# Patient Record
(demographics unavailable — no encounter records)

---

## 2025-02-07 NOTE — PHYSICAL EXAM
[de-identified] : Thoracic spine:  No atrophy, erythema, swelling or ecchymosis.  Tenderness over the thoracic paraspinals.

## 2025-02-07 NOTE — PHYSICAL EXAM
[de-identified] : Thoracic spine:  No atrophy, erythema, swelling or ecchymosis.  Tenderness over the thoracic paraspinals.

## 2025-02-07 NOTE — DISCUSSION/SUMMARY
[de-identified] : A discussion regarding available pain management treatment options occurred with the patient. These included interventional, rehabilitative, pharmacological, and alternative modalities. We will proceed with the following:   Imagin. MRI thoracic spine w/o contrast to evaluate for anatomic changes of the thoracic discs, nerves, and surrounding tissue that will help provide information to accurately diagnose the patient's cause of pain and therefore treat said pain generator in the most effective way possible - whether that be specific physical therapy recommendations, medications, and/or interventional therapies.   Medications: - can take Tylenol 1000 mg up to q6h prn.    Complementary treatment options: - lifestyle modifications discussed - avoid bending and bend with knees - avoid heavy lifting   - Follow up in 2-3 weeks to discuss imaging.   I Diana Avelar attest that this documentation has been prepared under the direction and in the presence of provider Dr. Moisés Cowan.  The documentation recorded by the scribe in my presence, accurately reflects the service I personally performed, and the decisions made by me with my edits as appropriate.   Moisés Cowan, DO

## 2025-02-07 NOTE — DISCUSSION/SUMMARY
[de-identified] : A discussion regarding available pain management treatment options occurred with the patient. These included interventional, rehabilitative, pharmacological, and alternative modalities. We will proceed with the following:   Imagin. MRI thoracic spine w/o contrast to evaluate for anatomic changes of the thoracic discs, nerves, and surrounding tissue that will help provide information to accurately diagnose the patient's cause of pain and therefore treat said pain generator in the most effective way possible - whether that be specific physical therapy recommendations, medications, and/or interventional therapies.   Medications: - can take Tylenol 1000 mg up to q6h prn.    Complementary treatment options: - lifestyle modifications discussed - avoid bending and bend with knees - avoid heavy lifting   - Follow up in 2-3 weeks to discuss imaging.   I Diana Avelar attest that this documentation has been prepared under the direction and in the presence of provider Dr. Moisés Cowan.  The documentation recorded by the scribe in my presence, accurately reflects the service I personally performed, and the decisions made by me with my edits as appropriate.   Moisés Cowan, DO

## 2025-02-07 NOTE — HISTORY OF PRESENT ILLNESS
[FreeTextEntry1] : Ms. Panchal is a 67-year-old female presenting to establish care for her back pain. She is accompanied by her . In June of 2024, she fell down a flight of stairs injuring her mid back and buttock. She ended up going to an urgent care facility the day of and underwent an X-ray of the thoracic spine which was negative for any fracture. Today, she is presenting with intermittent pain in the mid thoracic region. She states her pain waxes and weens. She notes pain mainly on the left side of the thoracic region with referred pain into the ribs. She describes the pain as throbbing, aching and stiffness. She states that pain is never severe to the pain that she cannot function. She rates the pain at a 5/10 on the pain scale. She denies any bowel/bladder incontinence, fevers/chills, recent weight loss or any saddle anesthesia. She has not been managing her pain with any medications.

## 2025-03-11 NOTE — DISCUSSION/SUMMARY
[de-identified] : A discussion regarding available pain management treatment options occurred with the patient. These included interventional, rehabilitative, pharmacological, and alternative modalities. We will proceed with the followin-year-old female with T5 compression fracture, she has osteoporosis, she is following up with her primary care in this regard, she is currently not being medically managed for osteoporosis.  She will follow-up with her PCP to start management.  Interventional/ Procedures: 1. None indicated at this time.   Medication/pharmacological: 1. C/W Tylenol OTC as needed, patient was advised not to exceed 3000 mg per 24 hours.  Patient was advised to avoid alcohol while being on Tylenol. **Unable to take anti-inflammatories due to elevated creatinine level**   Rehabilitative/alternative modalities: 1. Initiate physician directed activity and lifestyle modifications. 2. Participation in active HEP was discussed and printed. 3. Patient was advised to stay away from any heavy lifting. If needed, they were advised to squat and not bend forward. 4.  Patient was given a prescription for physical therapy for thoracic pain/compression fraction at T5  Total clinician time spent today on the patient is 30 minutes including preparing to see the patient, obtaining and/or reviewing and confirming history, performing medically necessary and appropriate examination, counseling and educating the patient and/or family, documenting clinical information in the EHR and communicating and/or referring to other healthcare professionals.  F/u in 6-8 weeks   Total clinician time spent today on the patient is 30 minutes including preparing to see the patient, obtaining and/or reviewing and confirming history, performing medically necessary and appropriate examination, counseling and educating the patient and/or family, documenting clinical information in the EHR and communicating and/or referring to other healthcare professionals.   YAAKOV Oliva DO

## 2025-03-11 NOTE — DISCUSSION/SUMMARY
[de-identified] : A discussion regarding available pain management treatment options occurred with the patient. These included interventional, rehabilitative, pharmacological, and alternative modalities. We will proceed with the followin-year-old female with T5 compression fracture, she has osteoporosis, she is following up with her primary care in this regard, she is currently not being medically managed for osteoporosis.  She will follow-up with her PCP to start management.  Interventional/ Procedures: 1. None indicated at this time.   Medication/pharmacological: 1. C/W Tylenol OTC as needed, patient was advised not to exceed 3000 mg per 24 hours.  Patient was advised to avoid alcohol while being on Tylenol. **Unable to take anti-inflammatories due to elevated creatinine level**   Rehabilitative/alternative modalities: 1. Initiate physician directed activity and lifestyle modifications. 2. Participation in active HEP was discussed and printed. 3. Patient was advised to stay away from any heavy lifting. If needed, they were advised to squat and not bend forward. 4.  Patient was given a prescription for physical therapy for thoracic pain/compression fraction at T5  Total clinician time spent today on the patient is 30 minutes including preparing to see the patient, obtaining and/or reviewing and confirming history, performing medically necessary and appropriate examination, counseling and educating the patient and/or family, documenting clinical information in the EHR and communicating and/or referring to other healthcare professionals.  F/u in 6-8 weeks   Total clinician time spent today on the patient is 30 minutes including preparing to see the patient, obtaining and/or reviewing and confirming history, performing medically necessary and appropriate examination, counseling and educating the patient and/or family, documenting clinical information in the EHR and communicating and/or referring to other healthcare professionals.   YAAKOV Oliva DO

## 2025-03-11 NOTE — HISTORY OF PRESENT ILLNESS
[FreeTextEntry1] : Ms. Panchal is a 67-year-old female presenting to establish care for her back pain. She is accompanied by her . In June of 2024, she fell down a flight of stairs injuring her mid back and buttock. She ended up going to an urgent care facility the day of and underwent an X-ray of the thoracic spine which was negative for any fracture. Today, she is presenting with intermittent pain in the mid thoracic region. She states her pain waxes and weens. She notes pain mainly on the left side of the thoracic region with referred pain into the ribs. She describes the pain as throbbing, aching and stiffness. She states that pain is never severe to the pain that she cannot function. She rates the pain at a 9/10 on the pain scale. She denies any bowel/bladder incontinence, fevers/chills, recent weight loss or any saddle anesthesia. She has not been managing her pain with any medications.   TODAY (3/11/25)  Pt is a very pleasant 67-year-old female who is here for follow-up, she is under our care for thoracic back pain.  She underwent thoracic MRI which showed T5 compression fracture and with bone marrow edema. Pain is worse when she leans forward,  She describes the pain as throbbing, aching and stiffness. She states that pain is never severe to the pain that she cannot function. She rates the pain at a 9/10 on the pain scale. she has been managing her pain with Tylenol 600 mg 1-2 times a day which os giving her good relief.

## 2025-03-11 NOTE — PHYSICAL EXAM
[de-identified] : T spine  No rashes, erythema, edema noted TTP b/l thoracic paraspinals and rhomboid muscles Limited ROM with neck/back flexion and extension 2/2 to pain  seated slump test +

## 2025-03-11 NOTE — PHYSICAL EXAM
[de-identified] : T spine  No rashes, erythema, edema noted TTP b/l thoracic paraspinals and rhomboid muscles Limited ROM with neck/back flexion and extension 2/2 to pain  seated slump test +

## 2025-05-08 NOTE — DISCUSSION/SUMMARY
[FreeTextEntry1] : Assessment Summary:  [The patient is a 68-year-old female who is currently  with no children, residing with her spouse of six months. Born and raised in [place], she moved to [place] in second grade. She recently experienced a job loss in November 2024 after working at [employer] for 14 years and has a total of 48 years of work experience in various companies on [address]. She completed some college education. 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   The patient was last seen by a psychiatrist in 1990, seeking help for depression and anxiety, which she described as circumstantial due to going through a divorce, the death of her grandmother, and being laid off. She was prescribed Prozac and took it for less than a year, which helped her start feeling better. She denies any suicidal or self-harming behaviors, substance use, psychiatric hospitalizations, or incarceration.   In March of this year, she contracted COVID-19 for the third time, which significantly impacted her mental health. She reported feelings of inadequacy and a sense of not belonging, exacerbating some unresolved childhood feelings. This condition manifested with fever, sinus infection, pain, frequent panic attacks, and major anxiety. Though these acute symptoms have subsided, she currently struggles with depression, low mood, poor concentration, and feelings of displacement.   The patient lost her mother a year ago, an event that likely compounded her current emotional state. She is actively seeking employment, joining adult friendship groups, attending Sikh, and volunteering in an effort to stay engaged and manage her symptoms. She remains physically active, frequently visiting the gym and going for long walks with her .   Her medical history includes high blood pressure and high cholesterol, for which she is taking Crestor 10 mg, nebivolol 10 mg, and nifedipine 10 mg.   A significant trauma in her past includes being sent to live with her grandmother at the age of 12 to act as her caregiver, a decision made by her mother that led her to feel abandoned. This responsibility meant she missed out on significant family experiences, such as seeing her younger brother grow up. Recently, similar feelings of abandonment and searching for purpose resurfaced during her COVID-19 experiences and subsequent unemployment.   The patient is here today for an evaluation of her symptoms to better understand and address her current mental health needs.  ]       Assessed Needs- Functional Domain [Anxiety ]       Prioritized Assessed Needs [Anxiety ]       Life goals, strengths, barriers, past successes ["to accept my future, to retire and be satisfied and find new purpose in life" ]       Recommendation:   [ ]      Medication Only [ ]     Individual therapy only [x ]     Medication and Individual therapy [ ]     Group therapy  [Low acute suicide risk] : Low acute suicide risk [No] : No [Not clinically indicated] : Safety Plan completed/updated (for individuals at risk): Not clinically indicated

## 2025-05-08 NOTE — REASON FOR VISIT
[Number can be texted] : number can be texted [OK  to leave message] : OK  to leave message [Community Based Organization] : Community Based Organization [FreeTextEntry3] : [email or web address]  [FreeTextEntry5] : English [FreeTextEntry6] : [employer] [FreeTextEntry7] : she/hers [[facility] Provider/Facility] : [facility] Provider/Facility [Patient] : Patient [FreeTextEntry2] : Anxiety/Depression post covid [FreeTextEntry1] : Anxiety/Depression

## 2025-05-08 NOTE — PHYSICAL EXAM
[Cooperative] : cooperative [Anxious] : anxious [Full] : full [Clear] : clear [Linear/Goal Directed] : linear/goal directed [Average] : average [WNL] : within normal limits [3 - Mildly ill] : 3 - Mildly ill  (clearly established symptoms with minimal, if any, distress or difficulty in social and occupational function) [Individual reports tobacco use during the last 30 days?] : Individual reports tobacco use during the last 30 days? No [Individual reports use of the following tobacco products during the last 30 days?] : Individual reports use of the following tobacco products during the last 30 days? No [Individual reports current use of tobacco cessation medication or nicotine replacement therapy?] : Individual reports current use of tobacco cessation medication or nicotine replacement therapy? No [Was tobacco cessation medication and/or nicotine replacement therapy recommended?] : Was tobacco cessation medication and/or nicotine replacement therapy recommended? No [Does individual accept referral to MD for cessation medication or NRT?] : Does individual accept referral to MD for cessation medication or NRT? No

## 2025-05-08 NOTE — PAST MEDICAL HISTORY
[FreeTextEntry1] : The patient is a 68-year-old female who is currently  with no children, residing with her spouse of six months. Born and raised in [place], she moved to [place] in second grade. She recently experienced a job loss in November 2024 after working at [employer] for 14 years and has a total of 48 years of work experience in various companies on [address]. She completed some college education. 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   The patient was last seen by a psychiatrist in 1990, seeking help for depression and anxiety, which she described as circumstantial due to going through a divorce, the death of her grandmother, and being laid off. She was prescribed Prozac and took it for less than a year, which helped her start feeling better. She denies any suicidal or self-harming behaviors, substance use, psychiatric hospitalizations, or incarceration.   In March of this year, she contracted COVID-19 for the third time, which significantly impacted her mental health. She reported feelings of inadequacy and a sense of not belonging, exacerbating some unresolved childhood feelings. This condition manifested with fever, sinus infection, pain, frequent panic attacks, and major anxiety. Though these acute symptoms have subsided, she currently struggles with depression, low mood, poor concentration, and feelings of displacement.   The patient lost her mother a year ago, an event that likely compounded her current emotional state. She is actively seeking employment, joining adult friendship groups, attending Taoist, and volunteering in an effort to stay engaged and manage her symptoms. She remains physically active, frequently visiting the gym and going for long walks with her .   Her medical history includes high blood pressure and high cholesterol, for which she is taking Crestor 10 mg, nebivolol 10 mg, and nifedipine 10 mg.   A significant trauma in her past includes being sent to live with her grandmother at the age of 12 to act as her caregiver, a decision made by her mother that led her to feel abandoned. This responsibility meant she missed out on significant family experiences, such as seeing her younger brother grow up. Recently, similar feelings of abandonment and searching for purpose resurfaced during her COVID-19 experiences and subsequent unemployment.   The patient is here today for an evaluation of her symptoms to better understand and address her current mental health needs.

## 2025-05-08 NOTE — HEALTH RISK ASSESSMENT
[With Patient/Caregiver] : , with patient/caregiver [AdvancecareDate] : 5/8/25 [No, patient unwilling or unclear about wishes] : No, patient unwilling or unclear about wishes [] : 5/8/25

## 2025-05-08 NOTE — HISTORY OF PRESENT ILLNESS
[Not Applicable] : Not applicable [FreeTextEntry1] : DAFNE CAUSEY  1957  [phone]  [email or web address]    Javad KnowlesJr. Spouse emergency contact  [phone]    PCP Aubrie Powers NP at [facility].   [facility]  [phone]     Intake date:5/8/25  Intake time in:2pm  Intake time out:3pm  Patient signed all consents. She does not qualify for . Patient has Medicare therefore must be seen in person.   The patient is a 68-year-old female who is currently  with no children, residing with her spouse of six months. Born and raised in [place], she moved to [place] in second grade. She recently experienced a job loss in November 2024 after working at [employer] for 14 years and has a total of 48 years of work experience in various companies on [address]. She completed some college education. 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   The patient was last seen by a psychiatrist in 1990, seeking help for depression and anxiety, which she described as circumstantial due to going through a divorce, the death of her grandmother, and being laid off. She was prescribed Prozac and took it for less than a year, which helped her start feeling better. She denies any suicidal or self-harming behaviors, substance use, psychiatric hospitalizations, or incarceration.   In March of this year, she contracted COVID-19 for the third time, which significantly impacted her mental health. She reported feelings of inadequacy and a sense of not belonging, exacerbating some unresolved childhood feelings. This condition manifested with fever, sinus infection, pain, frequent panic attacks, and major anxiety. Though these acute symptoms have subsided, she currently struggles with depression, low mood, poor concentration, and feelings of displacement.   The patient lost her mother a year ago, an event that likely compounded her current emotional state. She is actively seeking employment, joining adult friendship groups, attending Sabianist, and volunteering in an effort to stay engaged and manage her symptoms. She remains physically active, frequently visiting the gym and going for long walks with her .   Her medical history includes high blood pressure and high cholesterol, for which she is taking Crestor 10 mg, nebivolol 10 mg, and nifedipine 10 mg.   A significant trauma in her past includes being sent to live with her grandmother at the age of 12 to act as her caregiver, a decision made by her mother that led her to feel abandoned. This responsibility meant she missed out on significant family experiences, such as seeing her younger brother grow up. Recently, similar feelings of abandonment and searching for purpose resurfaced during her COVID-19 experiences and subsequent unemployment.   The patient is here today for an evaluation of her symptoms to better understand and address her current mental health needs.  Patient reports left hand pain/redness since yesterday morning. Patient reports left hand pain/redness since yesterday morning.  ISAR neg

## 2025-05-08 NOTE — RISK ASSESSMENT
[Clinical Interview] : Clinical Interview [PTSD] : PTSD [Depressed mood/Anhedonia] : depressed mood/anhedonia [Hopelessness or despair] : hopelessness or despair [Global insomnia] : global insomnia [Severe anxiety, agitation or panic] : severe anxiety, agitation or panic [Triggering events leading to humiliation, shame, and/or despair] : triggering events leading to humiliation, shame, and/or despair (e.g. loss of relationship, financial or health status) (real or anticipated) [Identifies reasons for living] : identifies reasons for living [Supportive social network of family or friends] : supportive social network of family or friends [Faith beliefs] : Jewish beliefs [Cultural, spiritual and/or moral attitudes against suicide] : cultural, spiritual and/or moral attitudes against suicide [None in the patient's lifetime] : None in the patient's lifetime [None Known] : none known [Hx of being victimized/traumatized] : history of being victimized/traumatized [Residential stability] : residential stability [Relationship stability] : relationship stability [Affective stability] : affective stability [No] : no

## 2025-05-08 NOTE — PSYCHOSOCIAL ASSESSMENT
[None known] : None known [Other: _____] : [unfilled] [Unemployed and looking for work] : unemployed and looking for work [Financially stable] : financially stable [None] : none [Private residence (home, apartment, rooming house, hotel, motel, supported housing, supported Single Room Occupancy (SRO),] : Private residence (home, apartment, rooming house, hotel, motel, supported housing, supported Single Room Occupancy (SRO), permanent housing programs, transient housing programs, and shelter plus care housing) [Client's spouse or domestic partner] : client's spouse or domestic partner [Spouse/Partner] : spouse/partner [Good] : good [Lives with Family Member] : lives with family member [No] : Patient has personal representation (legal guardian, representative payee, conservatorship)? No [FreeTextEntry7] : Javad Knowles Jr. Spouse emergency contact  [phone]

## 2025-05-08 NOTE — FAMILY HISTORY
[FreeTextEntry1] : Family composition:  The patient is a 68-year-old female who is currently  with no children, residing with her spouse of six months Family history and background:Born and raised in [place], she moved to [place] in second grade. Has an identical twin, an older sister and a brother. Family relationship: Pertinent Family Medical, MH and Substance Use History including Adult Child of Alcoholic and child of substance abuse status; history of cancer and heart disease: no

## 2025-05-08 NOTE — SOCIAL HISTORY
[FreeTextEntry1] : Employment history [  She recently experienced a job loss in November 2024 after working at [employer] for 14 years and has a total of 48 years of work experience in various companies on [address]. She completed some college education. ] Developmental history [deneid ] Sexual hx/identity Sexual History/ Concern (include sexual orientation and other relevant information)  [straight ] Race - ethnicity - culture information [White ] Social supports (friends, Volunteers, club, AA meeting, other meetings ) ? [Gym, Volunteers at [employer], attends adult day group ] Meaningful Activities: [gym and walks ]   Spiritual Assessment Tool - GHULAM BAER What is your fay or belief? [ "N/A"] Do you consider yourself spiritual or Amish? ["both" ] Is there something you believe in that gives meaning to your life? [ "god"] I: Is it important in your life? ["yes" ] What influence does it have on how you take care of yourself? [ "declined to answer" ] How have your beliefs influenced your behavior during this illness? What role do your beliefs play in regaining your health? [ "declined to answer" ] C. Are you part of a spiritual or Amish community? [ yes [employer]] Is this of support to you and how? ["volunteers" ] Is there a person or group of people you really love or who are really important to you? ["family and friends" ] H. We have been discussing your belief and supports. What else gives you internal support?["declined to answer" ] What are your sources of hope, strength, comfort and peace? ["family and friends" ] What do you hold on to during difficult times? ["family" ] what sustains you and keeps you going? ["family" ] A. How would you like me, your healthcare provider, to address these issues in your healthcare? ["to help with anxiety" ]

## 2025-05-29 NOTE — PLAN
[FreeTextEntry2] : Patient did not yet see Dr Hendrix for psych eval. tx plan will be completed after patient is admitted to the clinic.  [Cognitive and/or Behavior Therapy] : Cognitive and/or Behavior Therapy  [Motivational Interviewing] : Motivational Interviewing  [Psychoeducation] : Psychoeducation  [Supportive Therapy] : Supportive Therapy [de-identified] : The therapy session commenced at 3:00 PM and concluded at 3:45 PM, conducted via telehealth with the patient participating from her home in the dining room area. This session marked her initial therapy appointment following a period of notable distress. The patient reported a slight improvement in her mental state compared to her initial intake, where she felt depressed and anxious, particularly after losing her job in November and karl COVID-19 in March, which significantly heightened her depressive symptoms.  She is scheduled for a psychiatric evaluation with Dr. Hendrix on June 12th. In the meantime, it was recommended that she continue therapy sessions to support her emotional well-being.  The patient has been experiencing severe sleep disturbances, managing only three hours of sleep per night. However, she reported a recent improvement, having taken 8 milligrams of melatonin the past two nights, which successfully helped her achieve a full night's sleep. This improvement in sleep has positively impacted her mood.  During the session, the patient discussed various aspects of her personal history, including her childhood and memories of her late mother, and emotional dynamics with her sisters. Currently unemployed, she actively volunteers at her [employer] and participates in an adult day program to stay engaged and productive. Despite these efforts, she expressed a pressing need for pharmacological support to manage her depression, a feeling she finds difficult to articulate fully.  The patient assured that she has no suicidal, homicidal ideations, or self-harming behaviors. The therapeutic approach included supportive listening, reflective listening, supportive therapy, cognitive-behavioral therapy (CBT), and motivational interviewing, aimed at providing a comprehensive supportive framework to address her needs.

## 2025-05-29 NOTE — REASON FOR VISIT
[Patient preference] : as per patient preference [Telehealth (audio & video) - Individual/Group] : This visit was provided via telehealth using real-time 2-way audio visual technology. [Medical Office: ([facility])___] : The provider was located at the medical office in [unfilled]. [Home] : The patient, [unfilled], was located at home, [unfilled], at the time of the visit. [Patient's space is appropriate for telehealth and maintains privacy/confidentiality.] : Patient's space is appropriate for telehealth and maintains privacy/confidentiality. [Participant(s) identity verified] : Participant(s) identity verified. [For new patient(s) – practitioner identifies themselves to participants] : Practitioner identifies themselves to participants. [Verbal consent obtained from patient/other participant(s)] : Verbal consent for telehealth/telephonic services obtained from patient/other participant(s) [If not patient, verbal consent obtained from parent/guardian/caretaker (name, relationship) ___ with patient assenting] : Verbal consent for telehealth/telephonic services was obtained from parent/guardian/caretaker, [unfilled], with patient assenting. [FreeTextEntry4] : 3pm [FreeTextEntry5] : 3:45pm [Patient] : Patient

## 2025-05-29 NOTE — PHYSICAL EXAM
[4 - Moderately ill] : 4 - Moderately ill  (overt symptoms causing noticeable, but modest, functional impairment or distress; symptom level may warrant medication) [4 - No change] : 4 - No change  (symptoms remain essentially unchanged) [Individual reports tobacco use during the last 30 days?] : Individual reports tobacco use during the last 30 days? No [Individual reports use of the following tobacco products during the last 30 days?] : Individual reports use of the following tobacco products during the last 30 days? No [Individual reports current use of tobacco cessation medication or nicotine replacement therapy?] : Individual reports current use of tobacco cessation medication or nicotine replacement therapy? No [Was tobacco cessation medication and/or nicotine replacement therapy recommended?] : Was tobacco cessation medication and/or nicotine replacement therapy recommended? No [Does individual accept referral to MD for cessation medication or NRT?] : Does individual accept referral to MD for cessation medication or NRT? No

## 2025-06-02 NOTE — ASSESSMENT
[FreeTextEntry1] : Assessment: Bilateral knee arthritis, right worse than left Status post cortisone injections in March 2024 with no significant pain relief Status post Euflexxa injections in November 2024 at an outside practice with good pain relief  Plan: 1.  Clinical and radiographic findings reviewed with the patient.  I reviewed her x-rays with her. 2.  I discussed the natural history of arthritis as well as treatment options including nonoperative versus operative options.  She has had good pain relief from prior gel injections.  She is interested in repeat injections.  I ordered Synvisc gel injections for her bilateral knees. 3.  I would like to see her back for the gel injections to the both knees.  Plan of care discussed with the patient and she is in agreement.  All questions were answered.  I encouraged her to reach out with any questions or concerns.  X-rays needed at next visit: None

## 2025-06-02 NOTE — HISTORY OF PRESENT ILLNESS
[de-identified] : The patient is a 68-year-old female who is here today for evaluation of bilateral knee pain, right worse than left.  She has a long history of pain in both knees.  She was previously following with Dr. Valladares for arthritis.  She reports receiving cortisone injections in March of last year which did not help much.  She then received a series of 3 Euflexxa injections in November of last year with good pain relief.  She then changed her insurance and was unable to follow-up with Dr. Valladares.  She is here to establish care.  She reports persistent pain in both knees, right worse than left.  She rates the pain as 2 out of 10 at rest and 7 out of 10 with activity.  Past medical/surgical history: Bunion surgery, kidney stone  Allergies: Sensitivity to codeine  Current medications: Nebivolol, nifedipine, Crestor  Family history: Heart disease, myeloma  Social history:  Denies alcohol use Denies recreational drug use  Review of systems: A 10 point review of systems was positive for back pain, high blood pressure, depression, hearing change, otherwise unremarkable  The patient is well-appearing.  Her height is 5 foot 5.  Examination of the bilateral knees demonstrates intact skin.  No significant effusion.  She is tender over the lateral joint line on the right.  Otherwise no tenderness over the joint lines.  Positive crepitus bilaterally.  Knee range of motion from 0-130.  Negative Lachman's and negative posterior drawer.  Stable to varus and valgus stress.  Neurovascularly intact distally.  Bilateral knee x-rays taken at an outside facility on 3/13/2025 were personally reviewed, report was also reviewed: Bilateral tricompartmental degenerative changes with lateral joint space narrowing on the right 85

## 2025-06-12 NOTE — PLAN
[Admit to Program     (Add Program Admission information to a new column in the Admit/Discharge Flowsheet)] : Admit to program [Every ___ week(s)] : Psychotherapy: Every [unfilled] week(s) [FreeTextEntry4] : RTC as needed. Continue therapy with Ardita.  No medications are indicated at this time.

## 2025-06-12 NOTE — REASON FOR VISIT
[Patient preference] : as per patient preference [Telehealth (audio & video) - Individual/Group] : This visit was provided via telehealth using real-time 2-way audio visual technology. [Medical Office: ([facility])___] : The provider was located at the medical office in [unfilled]. [Home] : The patient, [unfilled], was located at home, [unfilled], at the time of the visit. [Patient's space is appropriate for telehealth and maintains privacy/confidentiality.] : Patient's space is appropriate for telehealth and maintains privacy/confidentiality. [Participant(s) identity verified] : Participant(s) identity verified. [For new patient(s) – practitioner identifies themselves to participants] : Practitioner identifies themselves to participants. [Verbal consent obtained from patient/other participant(s)] : Verbal consent for telehealth/telephonic services obtained from patient/other participant(s) [If not patient, verbal consent obtained from parent/guardian/caretaker (name, relationship) ___ with patient assenting] : Verbal consent for telehealth/telephonic services was obtained from parent/guardian/caretaker, [unfilled], with patient assenting. [FreeTextEntry4] : 3pm [FreeTextEntry5] : 3:45pm [Patient] : Patient

## 2025-06-12 NOTE — FAMILY HISTORY
[FreeTextEntry1] : Father - history of Major Depressive Disorder treated with ECT  Mother - history of possible Posttraumatic Stress Disorder from events during World War II

## 2025-06-12 NOTE — PLAN
[FreeTextEntry2] : Patient did not yet see Dr Hendrix for psych eval. tx plan will be completed after patient is admitted to the clinic.  [Cognitive and/or Behavior Therapy] : Cognitive and/or Behavior Therapy  [Motivational Interviewing] : Motivational Interviewing  [Psychoeducation] : Psychoeducation  [Supportive Therapy] : Supportive Therapy [de-identified] : The therapy session commenced at 3:00 PM and concluded at 3:45 PM, conducted via telehealth with the patient participating from her home in the dining room area. Since our initial appointment a month ago and a subsequent session two weeks ago, the patient reported significant improvement in managing her anxiety and depression. She attributes this progress, in part, to taking melatonin at night, which has greatly improved her sleep. She now enjoys 7-8 hours of sleep nightly and wakes feeling refreshed and more positive.  Reflecting on our previous discussions about acceptance, the patient recognizes that being laid off in November, while initially devastating, has ultimately led to some positive changes. Although she had intended to work two more years until the age of 70, she is gradually accepting her new circumstances and finding purpose in other areas of her life.  She has become more active, volunteering at her [employer], attending an adult day program, and spending quality time with her . Additionally, she has secured a part-time job interview in [place] tomorrow, which she is looking forward to. Having had time to relax and adjust, she no longer desires full-time employment, finding her current lifestyle at home quite enjoyable.  The patient has an upcoming psychiatric evaluation with Dr. Hendrix tomorrow morning at 10:00 AM and anticipates discussing his recommendations. The patient assured that she has no suicidal, homicidal ideations, or self-harming behaviors. The therapeutic approach included supportive listening, reflective listening, supportive therapy, cognitive-behavioral therapy (CBT), and motivational interviewing, aimed at providing a comprehensive supportive framework to address her needs.

## 2025-06-12 NOTE — PAST MEDICAL HISTORY
[FreeTextEntry1] : Primary Provider:  Tirso Boyd D.O. - [phone]  Allergies:  NKA   Hypertension - on Losartan 50 mg daily and Nebivolol 10 mg daily  Hyperlipidemia - on Rosuvastatin 10 mg daily

## 2025-06-12 NOTE — SOCIAL HISTORY
[FreeTextEntry1] : The patient was born in [place] and moved to [place] at age 8. She was raised by her parents who are both  as of . Patient currently resides in [place] with her . Patient is previously  and reconnected with her current  whom she's known since high school. They got  in 2024. She has an identical twin who lives on [place] and a brother and sister, who are 10 years younger and 2 years older, respectively. She is in contact with her twin, but not her other siblings. She is currently unemployed after working on [employer] for 48 years. Highest level of education is some college at [employer] in [place], [place]. Her primary sources of support are her friends and her .   Substance Use History:  Patient denies history of any recreational substance use.    Patient denies current EtOH or tobacco consumption.

## 2025-06-12 NOTE — HISTORY OF PRESENT ILLNESS
[FreeTextEntry1] : Sugar, a 68-year-old female with a past psychiatric history of adjustment disorder with mixed anxiety and depressed mood , presented for an intake assessment to establish psychiatric care.  She self-referred to the Outpatient Mental Health ([facility]) clinic after being recommended by a neighbor who previously received treatment at the [facility] inpatient psychiatric program ([facility]).   Sugar reports overall well-being after previously experiencing depressive and anxious symptoms related to  employment termination after 14 years with her company.  She states these symptoms of anxiety and depression were exacerbated perpetuated by a concurrent SARS-CoV infection. Since [facility] intake assessment with her therapist, Song, her symptoms have resolved, and she currently has no acute psychiatric complaints.  She rates her baseline mood as 7 or 8 out of 10 in nature and rates her anxiety as 1 out of 10 in severity. She reports being engaged in a senior citizen program and is actively seeking new employment opportunities.   On examination, Sugar presented as euthymic, friendly, relaxed, and conversational. She reports stable mental health and denies any depressive or anxious symptoms.  There was no evidence of hypomania, adelita, or psychosis observed.  She is future-oriented and denies any suicidal ideation with intent or plan.  [FreeTextEntry2] : As per patient report and medical records:  -Prior psychiatric diagnoses include: Depression & Anxiety  -Denies history of prior inpatient psychiatric hospitalizations.  -History of brief outpatient psychiatric care approximately 35 years ago for what the patient describes as "mostly anxiety" related to a series of unfortunate events (death of grandmother, divorce, and loss of employment).  -History of a previous medication trial on: Fluoxetine (with good effect) for 6 months.  -History of depressive symptoms related to life stressors, but no evidence of a major depressive episode.  -Denies history of hypomanic/manic episodes.  -History of anxious symptomatology and panic attacks related to life stressors. She reports her last panic attack was in April 2025 and consisted of paresthesia, sweating, palpitations, and an overall sense of fear/panic.  -Denies history of suicidal ideations or attempts.  -Denies history of psychotic symptomatology.  -Denies history of self-injurious behavior or self-mutilation.  -Denies history of verbal or physical aggression.    -Denies history of property destruction.  -Patient does not own or have immediate access to firearms or weapons.  -Denies history of legal charges.  -Denies history of verbal, physical, or sexual abuse.  [FreeTextEntry3] : Fluoxetine.

## 2025-06-12 NOTE — REASON FOR VISIT
[FreeTextEntry1] : Sugar ("Eye-Ee-Dadarvin") is a 68-year-old [place] American female, , currently unemployed, living with  in [place], [place].   CC: "I'm doing pretty well. I've come a long way" - the patient presented in person for intake assessment

## 2025-06-12 NOTE — DISCUSSION/SUMMARY
[FreeTextEntry1] : Sugar is a 68-year-old female with a past psychiatric history of depression and anxiety who presented to the [facility] clinic for intake assessment on 06/12/25. Patient is currently in full remission of symptoms which formerly coincided with her termination from employment and an episode of SARS-Cov-2. Protective factors include insight and motivation for treatment, good physical health, supportive relationships, and high pre-morbid functionality. Exacerbating factors are not overtly apparent or present at this time.  The patient's prognosis is good, contingent upon adherence to follow-up appointments and ongoing supportive services.  [Low acute suicide risk] : Low acute suicide risk [No] : No [Not clinically indicated] : Safety Plan completed/updated (for individuals at risk): Not clinically indicated

## 2025-06-12 NOTE — REASON FOR VISIT
[Patient preference] : as per patient preference [Telehealth (audio & video) - Individual/Group] : This visit was provided via telehealth using real-time 2-way audio visual technology. [Medical Office: ([facility])___] : The provider was located at the medical office in [unfilled]. [Home] : The patient, [unfilled], was located at home, [unfilled], at the time of the visit. [Patient's space is appropriate for telehealth and maintains privacy/confidentiality.] : Patient's space is appropriate for telehealth and maintains privacy/confidentiality. [Participant(s) identity verified] : Participant(s) identity verified. [For new patient(s) – practitioner identifies themselves to participants] : Practitioner identifies themselves to participants. [Verbal consent obtained from patient/other participant(s)] : Verbal consent for telehealth/telephonic services obtained from patient/other participant(s) [If not patient, verbal consent obtained from parent/guardian/caretaker (name, relationship) ___ with patient assenting] : Verbal consent for telehealth/telephonic services was obtained from parent/guardian/caretaker, [unfilled], with patient assenting. [FreeTextEntry4] : 3pm [FreeTextEntry5] : 3:45pm [Patient] : Patient

## 2025-06-12 NOTE — PLAN
[FreeTextEntry2] : Patient did not yet see Dr Hendrix for psych eval. tx plan will be completed after patient is admitted to the clinic.  [Cognitive and/or Behavior Therapy] : Cognitive and/or Behavior Therapy  [Motivational Interviewing] : Motivational Interviewing  [Psychoeducation] : Psychoeducation  [Supportive Therapy] : Supportive Therapy [de-identified] : The therapy session commenced at 3:00 PM and concluded at 3:45 PM, conducted via telehealth with the patient participating from her home in the dining room area. Since our initial appointment a month ago and a subsequent session two weeks ago, the patient reported significant improvement in managing her anxiety and depression. She attributes this progress, in part, to taking melatonin at night, which has greatly improved her sleep. She now enjoys 7-8 hours of sleep nightly and wakes feeling refreshed and more positive.  Reflecting on our previous discussions about acceptance, the patient recognizes that being laid off in November, while initially devastating, has ultimately led to some positive changes. Although she had intended to work two more years until the age of 70, she is gradually accepting her new circumstances and finding purpose in other areas of her life.  She has become more active, volunteering at her [employer], attending an adult day program, and spending quality time with her . Additionally, she has secured a part-time job interview in [place] tomorrow, which she is looking forward to. Having had time to relax and adjust, she no longer desires full-time employment, finding her current lifestyle at home quite enjoyable.  The patient has an upcoming psychiatric evaluation with Dr. Hendrix tomorrow morning at 10:00 AM and anticipates discussing his recommendations. The patient assured that she has no suicidal, homicidal ideations, or self-harming behaviors. The therapeutic approach included supportive listening, reflective listening, supportive therapy, cognitive-behavioral therapy (CBT), and motivational interviewing, aimed at providing a comprehensive supportive framework to address her needs.

## 2025-06-23 NOTE — PLAN
[FreeTextEntry2] : Patient did not yet see Dr Hendrix for psych eval. tx plan will be completed after patient is admitted to the clinic.  [Cognitive and/or Behavior Therapy] : Cognitive and/or Behavior Therapy  [Motivational Interviewing] : Motivational Interviewing  [Psychoeducation] : Psychoeducation  [Supportive Therapy] : Supportive Therapy [de-identified] : The therapy session commenced at 10AM and concluded at 10:45 AM, conducted via telehealth with the patient participating from her home in the dining room area. Client reports significant improvement in her mental health since her initial session, where she presented with high anxiety and depressive symptoms. She reports feeling much better and more optimistic. The client recently met with Dr. Hendrix for psychiatric evaluation; however, due to notable improvement, no medication was initiated. Client is aware that she can follow up with Dr. Calderon if symptoms return or intensify. She shared that she had a job interview and is being considered for multiple opportunities, but is currently waiting for a consultation with her knee specialist scheduled for tomorrow. If surgery is recommended, client intends to delay employment until after recovery. The patient assured that she has no suicidal, homicidal ideations, or self-harming behaviors. The therapeutic approach included supportive listening, reflective listening, supportive therapy, cognitive-behavioral therapy (CBT), and motivational interviewing, aimed at providing a comprehensive supportive framework to address her needs.

## 2025-06-23 NOTE — PLAN
[FreeTextEntry2] : Patient did not yet see Dr Hendrix for psych eval. tx plan will be completed after patient is admitted to the clinic.  [Cognitive and/or Behavior Therapy] : Cognitive and/or Behavior Therapy  [Motivational Interviewing] : Motivational Interviewing  [Psychoeducation] : Psychoeducation  [Supportive Therapy] : Supportive Therapy [de-identified] : The therapy session commenced at 10AM and concluded at 10:45 AM, conducted via telehealth with the patient participating from her home in the dining room area. Client reports significant improvement in her mental health since her initial session, where she presented with high anxiety and depressive symptoms. She reports feeling much better and more optimistic. The client recently met with Dr. Hendrix for psychiatric evaluation; however, due to notable improvement, no medication was initiated. Client is aware that she can follow up with Dr. Calderon if symptoms return or intensify. She shared that she had a job interview and is being considered for multiple opportunities, but is currently waiting for a consultation with her knee specialist scheduled for tomorrow. If surgery is recommended, client intends to delay employment until after recovery. The patient assured that she has no suicidal, homicidal ideations, or self-harming behaviors. The therapeutic approach included supportive listening, reflective listening, supportive therapy, cognitive-behavioral therapy (CBT), and motivational interviewing, aimed at providing a comprehensive supportive framework to address her needs.

## 2025-06-23 NOTE — REASON FOR VISIT
[Patient preference] : as per patient preference [Telehealth (audio & video) - Individual/Group] : This visit was provided via telehealth using real-time 2-way audio visual technology. [Medical Office: ([facility])___] : The provider was located at the medical office in [unfilled]. [Home] : The patient, [unfilled], was located at home, [unfilled], at the time of the visit. [Patient's space is appropriate for telehealth and maintains privacy/confidentiality.] : Patient's space is appropriate for telehealth and maintains privacy/confidentiality. [Participant(s) identity verified] : Participant(s) identity verified. [For new patient(s) – practitioner identifies themselves to participants] : Practitioner identifies themselves to participants. [Verbal consent obtained from patient/other participant(s)] : Verbal consent for telehealth/telephonic services obtained from patient/other participant(s) [If not patient, verbal consent obtained from parent/guardian/caretaker (name, relationship) ___ with patient assenting] : Verbal consent for telehealth/telephonic services was obtained from parent/guardian/caretaker, [unfilled], with patient assenting. [FreeTextEntry4] : 10am [FreeTextEntry5] : 10:45am [Patient] : Patient

## 2025-06-23 NOTE — REASON FOR VISIT
[Patient preference] : as per patient preference [Telehealth (audio & video) - Individual/Group] : This visit was provided via telehealth using real-time 2-way audio visual technology. [Medical Office: ([facility])___] : The provider was located at the medical office in [unfilled]. [Home] : The patient, [unfilled], was located at home, [unfilled], at the time of the visit. [Patient's space is appropriate for telehealth and maintains privacy/confidentiality.] : Patient's space is appropriate for telehealth and maintains privacy/confidentiality. [Participant(s) identity verified] : Participant(s) identity verified. [For new patient(s) – practitioner identifies themselves to participants] : Practitioner identifies themselves to participants. [Verbal consent obtained from patient/other participant(s)] : Verbal consent for telehealth/telephonic services obtained from patient/other participant(s) [If not patient, verbal consent obtained from parent/guardian/caretaker (name, relationship) ___ with patient assenting] : Verbal consent for telehealth/telephonic services was obtained from parent/guardian/caretaker, [unfilled], with patient assenting. [FreeTextEntry4] : 10am [FreeTextEntry5] : 10:45am [Patient] : Patient

## 2025-07-10 NOTE — HISTORY OF PRESENT ILLNESS
[de-identified] : The patient is a 68-year-old female who is here today for follow-up of bilateral knee arthritis.  She is here today for gel injections.  She reports that she is scheduled for a right knee replacement at [facility] in August.  She would like a gel injection to her left knee.  She reports persistent pain in both knees, right more than left  The patient is well-appearing.  Examination of the left knee demonstrates intact skin.  No significant swelling.  Nontender over the medial lateral joint lines.  Positive crepitus.  Knee range of motion from 0-130.  No new imaging was obtained today

## 2025-07-10 NOTE — ASSESSMENT
[FreeTextEntry1] : Assessment: Left knee arthritis Status post cortisone injections in March 2024 with no significant pain relief Status post Euflexxa injections in November 2024 at an outside practice with good pain relief Here for gel injection to left knee Scheduled for right knee replacement at [facility] in August  Plan: 1.  The risk, benefits, and alternatives of a left knee gel injection were discussed with the patient.  The risks included but were not limited to bleeding, infection, injury to nearby nerve/vessel/structures, and no guarantee of pain relief.  The patient understood and elected to proceed.  Verbal consent was obtained.  A left knee Synvisc One gel injection was performed under sterile conditions.  She tolerated the procedure well.  I discussed with her that should she have good pain relief from injection, we can repeat the injection every 6 months as needed. 2.  I would like to see her back for follow-up in 6 months for her left knee.  Plan of care discussed with the patient and she is in agreement.  All questions were answered.  I encouraged her to reach out with any questions or concerns.  X-rays needed at next visit: None

## 2025-07-22 NOTE — DISCUSSION/SUMMARY
[Initial Plan] : Initial Plan [Able to manage surrounding demands and opportunities] : able to manage surrounding demands and opportunities [Adherent to treatment recommendations] : adherent to treatment recommendations [Articulate] : articulate [Good impulse control] : good impulse control [Insightful] : insightful [Intelligent] : intelligent [Motivated to participate in treatment] : motivated to participate in treatment [Motivated and ready for change] : motivated and ready for change [Health literacy] : health literacy [Motivated to maintain or improve physical health] : motivated to maintain or improve physical health [Financially stable] : financially stable [Part of a supportive marriage] : part of a supportive marriage [Part of a supportive family] : part of a supportive family [Housing stability] : housing stability [English fluency] : English fluency [Connected to healthcare] : connected to healthcare [Access to safe outdoor spaces] : access to safe outdoor spaces [Social supports] : social supports [FreeTextEntry2] : 7/21/26 [FreeTextEntry3] : 6/12/2025 [de-identified] : Dr Hendrix [Mental Health] : Mental Health [Physical Health] : Physical Health [Initial] : Initial [every ___ months] : every [unfilled] months [every ___ weeks] : every [unfilled] weeks [Improvement in symptoms as evidenced by:] : Improvement in symptoms as evidenced by: [Other rationale for transition/discharge:] : Other rationale for transition/discharge: [None - Reason others did not participate:] : None - Reason others did not participate:  [Yes] : Yes [Psychiatric Provider/Prescriber] : Psychiatric Provider/Prescriber [Therapist] : Therapist [Supervisor (if needed)] : Supervisor [FreeTextEntry1] : Anxiety, knee replacement  [FreeTextEntry4] : "To get my knee replacement on August 1st 2025 and go through the process of healing" [de-identified] : new tx plan [de-identified] : Patient will follow up with all her medical providers as scheduled  [de-identified] : 7/21/26 [de-identified] : Assessed-short term [de-identified] : Have the knee replacement and work towards recovering. Learn coping skills to deal with the pain and keep positive mindset that this had to happen in order for me to restart being active.  [de-identified] : 7/21/26 [de-identified] : assessed-short term [FreeTextEntry5] : CBT [de-identified] : Dr Hendrix as needed [de-identified] : Song Evans LMSW-virtual [de-identified] : Improvement in symptoms as evidenced by: The patient expresses improvement in performing activities of daily living and/or says progress with initial complaint symptoms. In addition, the treatment team will conduct diagnose-based screenings as needed.  [de-identified] : Other rationale for transition/discharge: Other rationales for transition/discharge are granted/applied upon the patient DNC, relocate, self-termination, and/or requests for the treatment termination/transfer to another facility.  [de-identified] : patient declined [Tobacco Screening Completed?] : Tobacco Screening Completed: Yes

## 2025-07-22 NOTE — PLAN
[FreeTextEntry2] : Goal #1   Domain for Problem/Need I: Mental Health.     Objective A: Explore and apply coping skills effectively to reduce anxiety and reduce GAD7 score by 2-3 point by next treatment plan.   Verbalize thoughts and feelings to improve thought processes, self-awareness of triggers, and current utilization of coping strategies and report to therapist biweekly.   Lower SWEETIE 7 by 1-3 points until the next treatment plan.   Patient will lower CGI until next review.     Objective B: Patient will explore and process feelings, identifying two triggers of depression.   Patient will lower PHQ9 score 1-3 points until the next treatment plan review.      Goal #2   Domain for Problem/Need II: Physical Health.   Objective A: Patient will follow up with all her medical providers as scheduled   Objective B: The patient is scheduled for knee replacement surgery on August 1, 2025. Her goals include focusing on recovery, developing coping skills for pain management, and maintaining a positive outlook, viewing the surgery as a necessary step towards resuming an active lifestyle.         [Cognitive and/or Behavior Therapy] : Cognitive and/or Behavior Therapy  [Motivational Interviewing] : Motivational Interviewing  [Psychoeducation] : Psychoeducation  [Supportive Therapy] : Supportive Therapy [de-identified] : The therapy session commenced at 10AM and concluded at 10:45 AM, conducted via telehealth with the patient participating from her home in the dining room area. The patient, who has a scheduled knee replacement surgery on August 1, 2025 (just under two weeks from now), requested a session beforehand due to anxiety and fear about the procedure, particularly regarding pain management. Due to allergies to oxycodone and certain cardiac medications, she will be limited to milder analgesics such as Motrin or Tylenol, increasing her apprehension about postoperative pain. While she has strong support from her spouse, sister, and [employer] community, her independent nature and lifelong habit of self-reliance make it difficult for her to accept help. We discussed this, emphasizing the temporary nature of her dependence and the importance of accepting support during recovery. This conversation, along with a review of the surgery's pros and cons, led to a more positive outlook. She acknowledged that her current knee pain limits her mobility to less than two blocks and recognized the surgery's necessity for resuming an active lifestyle. She is now approaching the procedure with greater optimism, focusing on rest and recovery. A post-operative follow-up appointment was not scheduled. The patient plans to contact me for another therapy session when she feels emotionally and physically ready. The patient assured that she has no suicidal, homicidal ideations, or self-harming behaviors. The therapeutic approach included supportive listening, reflective listening, supportive therapy, cognitive-behavioral therapy (CBT), and motivational interviewing, aimed at providing a comprehensive supportive framework to address her needs.

## 2025-07-22 NOTE — DISCUSSION/SUMMARY
[Initial Plan] : Initial Plan [Able to manage surrounding demands and opportunities] : able to manage surrounding demands and opportunities [Adherent to treatment recommendations] : adherent to treatment recommendations [Articulate] : articulate [Good impulse control] : good impulse control [Insightful] : insightful [Intelligent] : intelligent [Motivated to participate in treatment] : motivated to participate in treatment [Motivated and ready for change] : motivated and ready for change [Health literacy] : health literacy [Motivated to maintain or improve physical health] : motivated to maintain or improve physical health [Financially stable] : financially stable [Part of a supportive marriage] : part of a supportive marriage [Part of a supportive family] : part of a supportive family [Housing stability] : housing stability [English fluency] : English fluency [Connected to healthcare] : connected to healthcare [Access to safe outdoor spaces] : access to safe outdoor spaces [Social supports] : social supports [FreeTextEntry2] : 7/21/26 [FreeTextEntry3] : 6/12/2025 [de-identified] : Dr Hendrix [Mental Health] : Mental Health [Physical Health] : Physical Health [Initial] : Initial [every ___ months] : every [unfilled] months [every ___ weeks] : every [unfilled] weeks [Improvement in symptoms as evidenced by:] : Improvement in symptoms as evidenced by: [Other rationale for transition/discharge:] : Other rationale for transition/discharge: [None - Reason others did not participate:] : None - Reason others did not participate:  [Yes] : Yes [Psychiatric Provider/Prescriber] : Psychiatric Provider/Prescriber [Therapist] : Therapist [Supervisor (if needed)] : Supervisor [FreeTextEntry1] : Anxiety, knee replacement  [FreeTextEntry4] : "To get my knee replacement on August 1st 2025 and go through the process of healing" [de-identified] : new tx plan [de-identified] : Patient will follow up with all her medical providers as scheduled  [de-identified] : 7/21/26 [de-identified] : Assessed-short term [de-identified] : Have the knee replacement and work towards recovering. Learn coping skills to deal with the pain and keep positive mindset that this had to happen in order for me to restart being active.  [de-identified] : 7/21/26 [de-identified] : assessed-short term [FreeTextEntry5] : CBT [de-identified] : Dr Hendrix as needed [de-identified] : Song Evans LMSW-virtual [de-identified] : Improvement in symptoms as evidenced by: The patient expresses improvement in performing activities of daily living and/or says progress with initial complaint symptoms. In addition, the treatment team will conduct diagnose-based screenings as needed.  [de-identified] : Other rationale for transition/discharge: Other rationales for transition/discharge are granted/applied upon the patient DNC, relocate, self-termination, and/or requests for the treatment termination/transfer to another facility.  [de-identified] : patient declined [Tobacco Screening Completed?] : Tobacco Screening Completed: Yes

## 2025-07-22 NOTE — REASON FOR VISIT
[Patient preference] : as per patient preference [Telehealth (audio & video) - Individual/Group] : This visit was provided via telehealth using real-time 2-way audio visual technology. [Medical Office: ([facility])___] : The provider was located at the medical office in [unfilled]. [Home] : The patient, [unfilled], was located at home, [unfilled], at the time of the visit. [Patient's space is appropriate for telehealth and maintains privacy/confidentiality.] : Patient's space is appropriate for telehealth and maintains privacy/confidentiality. [Participant(s) identity verified] : Participant(s) identity verified. [For new patient(s) – practitioner identifies themselves to participants] : Practitioner identifies themselves to participants. [Verbal consent obtained from patient/other participant(s)] : Verbal consent for telehealth/telephonic services obtained from patient/other participant(s) [If not patient, verbal consent obtained from parent/guardian/caretaker (name, relationship) ___ with patient assenting] : Verbal consent for telehealth/telephonic services was obtained from parent/guardian/caretaker, [unfilled], with patient assenting. [FreeTextEntry4] : 10am [FreeTextEntry5] : 10:45am [Patient] : Patient

## 2025-07-22 NOTE — REASON FOR VISIT
[Patient preference] : as per patient preference [Telehealth (audio & video) - Individual/Group] : This visit was provided via telehealth using real-time 2-way audio visual technology. [Medical Office: ([facility])___] : The provider was located at the medical office in [unfilled]. [Home] : The patient, [unfilled], was located at home, [unfilled], at the time of the visit. [Patient's space is appropriate for telehealth and maintains privacy/confidentiality.] : Patient's space is appropriate for telehealth and maintains privacy/confidentiality. [Participant(s) identity verified] : Participant(s) identity verified. [For new patient(s) – practitioner identifies themselves to participants] : Practitioner identifies themselves to participants. [Verbal consent obtained from patient/other participant(s)] : Verbal consent for telehealth/telephonic services obtained from patient/other participant(s) [If not patient, verbal consent obtained from parent/guardian/caretaker (name, relationship) ___ with patient assenting] : Verbal consent for telehealth/telephonic services was obtained from parent/guardian/caretaker, [unfilled], with patient assenting. [FreeTextEntry4] : 10am [FreeTextEntry5] : 10:45am [Patient] : Patient

## 2025-07-22 NOTE — RISK ASSESSMENT
2-3 times/wk [No, patient denies ideation or behavior] : No, patient denies ideation or behavior [Low acute suicide risk] : Low acute suicide risk [No] : No [Not clinically indicated] : Safety Plan completed/updated (for individuals at risk): Not clinically indicated

## 2025-07-22 NOTE — DISCUSSION/SUMMARY
[Initial Plan] : Initial Plan [Able to manage surrounding demands and opportunities] : able to manage surrounding demands and opportunities [Adherent to treatment recommendations] : adherent to treatment recommendations [Articulate] : articulate [Good impulse control] : good impulse control [Insightful] : insightful [Intelligent] : intelligent [Motivated to participate in treatment] : motivated to participate in treatment [Motivated and ready for change] : motivated and ready for change [Health literacy] : health literacy [Motivated to maintain or improve physical health] : motivated to maintain or improve physical health [Financially stable] : financially stable [Part of a supportive marriage] : part of a supportive marriage [Part of a supportive family] : part of a supportive family [Housing stability] : housing stability [English fluency] : English fluency [Connected to healthcare] : connected to healthcare [Access to safe outdoor spaces] : access to safe outdoor spaces [Social supports] : social supports [FreeTextEntry2] : 7/21/26 [FreeTextEntry3] : 6/12/2025 [de-identified] : Dr Hendrix [Mental Health] : Mental Health [Physical Health] : Physical Health [Initial] : Initial [every ___ months] : every [unfilled] months [every ___ weeks] : every [unfilled] weeks [Improvement in symptoms as evidenced by:] : Improvement in symptoms as evidenced by: [Other rationale for transition/discharge:] : Other rationale for transition/discharge: [None - Reason others did not participate:] : None - Reason others did not participate:  [Yes] : Yes [Psychiatric Provider/Prescriber] : Psychiatric Provider/Prescriber [Therapist] : Therapist [Supervisor (if needed)] : Supervisor [FreeTextEntry1] : Anxiety, knee replacement  [FreeTextEntry4] : "To get my knee replacement on August 1st 2025 and go through the process of healing" [de-identified] : new tx plan [de-identified] : Patient will follow up with all her medical providers as scheduled  [de-identified] : 7/21/26 [de-identified] : Assessed-short term [de-identified] : Have the knee replacement and work towards recovering. Learn coping skills to deal with the pain and keep positive mindset that this had to happen in order for me to restart being active.  [de-identified] : 7/21/26 [de-identified] : assessed-short term [FreeTextEntry5] : CBT [de-identified] : Dr Hendrix as needed [de-identified] : Song Evans LMSW-virtual [de-identified] : Improvement in symptoms as evidenced by: The patient expresses improvement in performing activities of daily living and/or says progress with initial complaint symptoms. In addition, the treatment team will conduct diagnose-based screenings as needed.  [de-identified] : Other rationale for transition/discharge: Other rationales for transition/discharge are granted/applied upon the patient DNC, relocate, self-termination, and/or requests for the treatment termination/transfer to another facility.  [de-identified] : patient declined [Tobacco Screening Completed?] : Tobacco Screening Completed: Yes

## 2025-07-22 NOTE — REASON FOR VISIT
[Patient preference] : as per patient preference [Telehealth (audio & video) - Individual/Group] : This visit was provided via telehealth using real-time 2-way audio visual technology. [Medical Office: ([facility])___] : The provider was located at the medical office in [unfilled]. [Home] : The patient, [unfilled], was located at home, [unfilled], at the time of the visit. [Patient's space is appropriate for telehealth and maintains privacy/confidentiality.] : Patient's space is appropriate for telehealth and maintains privacy/confidentiality. [Participant(s) identity verified] : Participant(s) identity verified. [For new patient(s) – practitioner identifies themselves to participants] : Practitioner identifies themselves to participants. [Verbal consent obtained from patient/other participant(s)] : Verbal consent for telehealth/telephonic services obtained from patient/other participant(s) [If not patient, verbal consent obtained from parent/guardian/caretaker (name, relationship) ___ with patient assenting] : Verbal consent for telehealth/telephonic services was obtained from parent/guardian/caretaker, [unfilled], with patient assenting. [FreeTextEntry4] : 10am [FreeTextEntry5] : 10:45am [Patient] : Patient

## 2025-07-22 NOTE — REASON FOR VISIT
[Patient preference] : as per patient preference [Telehealth (audio & video) - Individual/Group] : This visit was provided via telehealth using real-time 2-way audio visual technology. [Medical Office: ([facility])___] : The provider was located at the medical office in [unfilled]. [Home] : The patient, [unfilled], was located at home, [unfilled], at the time of the visit. [Patient's space is appropriate for telehealth and maintains privacy/confidentiality.] : Patient's space is appropriate for telehealth and maintains privacy/confidentiality. [Participant(s) identity verified] : Participant(s) identity verified. [For new patient(s) – practitioner identifies themselves to participants] : Practitioner identifies themselves to participants. [Verbal consent obtained from patient/other participant(s)] : Verbal consent for telehealth/telephonic services obtained from patient/other participant(s) [If not patient, verbal consent obtained from parent/guardian/caretaker (name, relationship) ___ with patient assenting] : Verbal consent for telehealth/telephonic services was obtained from parent/guardian/caretaker, [unfilled], with patient assenting. [FreeTextEntry4] : 10am [FreeTextEntry5] : 10:45am [Patient] : Patient

## 2025-07-22 NOTE — DISCUSSION/SUMMARY
[Initial Plan] : Initial Plan [Able to manage surrounding demands and opportunities] : able to manage surrounding demands and opportunities [Adherent to treatment recommendations] : adherent to treatment recommendations [Articulate] : articulate [Good impulse control] : good impulse control [Insightful] : insightful [Intelligent] : intelligent [Motivated to participate in treatment] : motivated to participate in treatment [Motivated and ready for change] : motivated and ready for change [Health literacy] : health literacy [Motivated to maintain or improve physical health] : motivated to maintain or improve physical health [Financially stable] : financially stable [Part of a supportive marriage] : part of a supportive marriage [Part of a supportive family] : part of a supportive family [Housing stability] : housing stability [English fluency] : English fluency [Connected to healthcare] : connected to healthcare [Access to safe outdoor spaces] : access to safe outdoor spaces [Social supports] : social supports [FreeTextEntry2] : 7/21/26 [FreeTextEntry3] : 6/12/2025 [de-identified] : Dr Hendrix [Mental Health] : Mental Health [Physical Health] : Physical Health [Initial] : Initial [every ___ months] : every [unfilled] months [every ___ weeks] : every [unfilled] weeks [Improvement in symptoms as evidenced by:] : Improvement in symptoms as evidenced by: [Other rationale for transition/discharge:] : Other rationale for transition/discharge: [None - Reason others did not participate:] : None - Reason others did not participate:  [Yes] : Yes [Psychiatric Provider/Prescriber] : Psychiatric Provider/Prescriber [Therapist] : Therapist [Supervisor (if needed)] : Supervisor [FreeTextEntry1] : Anxiety, knee replacement  [FreeTextEntry4] : "To get my knee replacement on August 1st 2025 and go through the process of healing" [de-identified] : new tx plan [de-identified] : Patient will follow up with all her medical providers as scheduled  [de-identified] : 7/21/26 [de-identified] : Assessed-short term [de-identified] : Have the knee replacement and work towards recovering. Learn coping skills to deal with the pain and keep positive mindset that this had to happen in order for me to restart being active.  [de-identified] : 7/21/26 [de-identified] : assessed-short term [FreeTextEntry5] : CBT [de-identified] : Dr Hendrix as needed [de-identified] : Song Evans LMSW-virtual [de-identified] : Improvement in symptoms as evidenced by: The patient expresses improvement in performing activities of daily living and/or says progress with initial complaint symptoms. In addition, the treatment team will conduct diagnose-based screenings as needed.  [de-identified] : Other rationale for transition/discharge: Other rationales for transition/discharge are granted/applied upon the patient DNC, relocate, self-termination, and/or requests for the treatment termination/transfer to another facility.  [de-identified] : patient declined [Tobacco Screening Completed?] : Tobacco Screening Completed: Yes

## 2025-07-22 NOTE — PLAN
[FreeTextEntry2] : Goal #1   Domain for Problem/Need I: Mental Health.     Objective A: Explore and apply coping skills effectively to reduce anxiety and reduce GAD7 score by 2-3 point by next treatment plan.   Verbalize thoughts and feelings to improve thought processes, self-awareness of triggers, and current utilization of coping strategies and report to therapist biweekly.   Lower SWEETIE 7 by 1-3 points until the next treatment plan.   Patient will lower CGI until next review.     Objective B: Patient will explore and process feelings, identifying two triggers of depression.   Patient will lower PHQ9 score 1-3 points until the next treatment plan review.      Goal #2   Domain for Problem/Need II: Physical Health.   Objective A: Patient will follow up with all her medical providers as scheduled   Objective B: The patient is scheduled for knee replacement surgery on August 1, 2025. Her goals include focusing on recovery, developing coping skills for pain management, and maintaining a positive outlook, viewing the surgery as a necessary step towards resuming an active lifestyle.         [Cognitive and/or Behavior Therapy] : Cognitive and/or Behavior Therapy  [Motivational Interviewing] : Motivational Interviewing  [Psychoeducation] : Psychoeducation  [Supportive Therapy] : Supportive Therapy [de-identified] : The therapy session commenced at 10AM and concluded at 10:45 AM, conducted via telehealth with the patient participating from her home in the dining room area. The patient, who has a scheduled knee replacement surgery on August 1, 2025 (just under two weeks from now), requested a session beforehand due to anxiety and fear about the procedure, particularly regarding pain management. Due to allergies to oxycodone and certain cardiac medications, she will be limited to milder analgesics such as Motrin or Tylenol, increasing her apprehension about postoperative pain. While she has strong support from her spouse, sister, and [employer] community, her independent nature and lifelong habit of self-reliance make it difficult for her to accept help. We discussed this, emphasizing the temporary nature of her dependence and the importance of accepting support during recovery. This conversation, along with a review of the surgery's pros and cons, led to a more positive outlook. She acknowledged that her current knee pain limits her mobility to less than two blocks and recognized the surgery's necessity for resuming an active lifestyle. She is now approaching the procedure with greater optimism, focusing on rest and recovery. A post-operative follow-up appointment was not scheduled. The patient plans to contact me for another therapy session when she feels emotionally and physically ready. The patient assured that she has no suicidal, homicidal ideations, or self-harming behaviors. The therapeutic approach included supportive listening, reflective listening, supportive therapy, cognitive-behavioral therapy (CBT), and motivational interviewing, aimed at providing a comprehensive supportive framework to address her needs.

## 2025-07-22 NOTE — PLAN
[FreeTextEntry2] : Goal #1   Domain for Problem/Need I: Mental Health.     Objective A: Explore and apply coping skills effectively to reduce anxiety and reduce GAD7 score by 2-3 point by next treatment plan.   Verbalize thoughts and feelings to improve thought processes, self-awareness of triggers, and current utilization of coping strategies and report to therapist biweekly.   Lower SWEETIE 7 by 1-3 points until the next treatment plan.   Patient will lower CGI until next review.     Objective B: Patient will explore and process feelings, identifying two triggers of depression.   Patient will lower PHQ9 score 1-3 points until the next treatment plan review.      Goal #2   Domain for Problem/Need II: Physical Health.   Objective A: Patient will follow up with all her medical providers as scheduled   Objective B: The patient is scheduled for knee replacement surgery on August 1, 2025. Her goals include focusing on recovery, developing coping skills for pain management, and maintaining a positive outlook, viewing the surgery as a necessary step towards resuming an active lifestyle.         [Cognitive and/or Behavior Therapy] : Cognitive and/or Behavior Therapy  [Motivational Interviewing] : Motivational Interviewing  [Psychoeducation] : Psychoeducation  [Supportive Therapy] : Supportive Therapy [de-identified] : The therapy session commenced at 10AM and concluded at 10:45 AM, conducted via telehealth with the patient participating from her home in the dining room area. The patient, who has a scheduled knee replacement surgery on August 1, 2025 (just under two weeks from now), requested a session beforehand due to anxiety and fear about the procedure, particularly regarding pain management. Due to allergies to oxycodone and certain cardiac medications, she will be limited to milder analgesics such as Motrin or Tylenol, increasing her apprehension about postoperative pain. While she has strong support from her spouse, sister, and [employer] community, her independent nature and lifelong habit of self-reliance make it difficult for her to accept help. We discussed this, emphasizing the temporary nature of her dependence and the importance of accepting support during recovery. This conversation, along with a review of the surgery's pros and cons, led to a more positive outlook. She acknowledged that her current knee pain limits her mobility to less than two blocks and recognized the surgery's necessity for resuming an active lifestyle. She is now approaching the procedure with greater optimism, focusing on rest and recovery. A post-operative follow-up appointment was not scheduled. The patient plans to contact me for another therapy session when she feels emotionally and physically ready. The patient assured that she has no suicidal, homicidal ideations, or self-harming behaviors. The therapeutic approach included supportive listening, reflective listening, supportive therapy, cognitive-behavioral therapy (CBT), and motivational interviewing, aimed at providing a comprehensive supportive framework to address her needs.

## 2025-07-22 NOTE — PLAN
[FreeTextEntry2] : Goal #1   Domain for Problem/Need I: Mental Health.     Objective A: Explore and apply coping skills effectively to reduce anxiety and reduce GAD7 score by 2-3 point by next treatment plan.   Verbalize thoughts and feelings to improve thought processes, self-awareness of triggers, and current utilization of coping strategies and report to therapist biweekly.   Lower SWEETIE 7 by 1-3 points until the next treatment plan.   Patient will lower CGI until next review.     Objective B: Patient will explore and process feelings, identifying two triggers of depression.   Patient will lower PHQ9 score 1-3 points until the next treatment plan review.      Goal #2   Domain for Problem/Need II: Physical Health.   Objective A: Patient will follow up with all her medical providers as scheduled   Objective B: The patient is scheduled for knee replacement surgery on August 1, 2025. Her goals include focusing on recovery, developing coping skills for pain management, and maintaining a positive outlook, viewing the surgery as a necessary step towards resuming an active lifestyle.         [Cognitive and/or Behavior Therapy] : Cognitive and/or Behavior Therapy  [Motivational Interviewing] : Motivational Interviewing  [Psychoeducation] : Psychoeducation  [Supportive Therapy] : Supportive Therapy [de-identified] : The therapy session commenced at 10AM and concluded at 10:45 AM, conducted via telehealth with the patient participating from her home in the dining room area. The patient, who has a scheduled knee replacement surgery on August 1, 2025 (just under two weeks from now), requested a session beforehand due to anxiety and fear about the procedure, particularly regarding pain management. Due to allergies to oxycodone and certain cardiac medications, she will be limited to milder analgesics such as Motrin or Tylenol, increasing her apprehension about postoperative pain. While she has strong support from her spouse, sister, and [employer] community, her independent nature and lifelong habit of self-reliance make it difficult for her to accept help. We discussed this, emphasizing the temporary nature of her dependence and the importance of accepting support during recovery. This conversation, along with a review of the surgery's pros and cons, led to a more positive outlook. She acknowledged that her current knee pain limits her mobility to less than two blocks and recognized the surgery's necessity for resuming an active lifestyle. She is now approaching the procedure with greater optimism, focusing on rest and recovery. A post-operative follow-up appointment was not scheduled. The patient plans to contact me for another therapy session when she feels emotionally and physically ready. The patient assured that she has no suicidal, homicidal ideations, or self-harming behaviors. The therapeutic approach included supportive listening, reflective listening, supportive therapy, cognitive-behavioral therapy (CBT), and motivational interviewing, aimed at providing a comprehensive supportive framework to address her needs.